# Patient Record
Sex: FEMALE | Race: WHITE | HISPANIC OR LATINO | URBAN - METROPOLITAN AREA
[De-identification: names, ages, dates, MRNs, and addresses within clinical notes are randomized per-mention and may not be internally consistent; named-entity substitution may affect disease eponyms.]

---

## 2023-09-12 ENCOUNTER — OUTPATIENT (OUTPATIENT)
Dept: OUTPATIENT SERVICES | Facility: HOSPITAL | Age: 33
LOS: 1 days | End: 2023-09-12
Payer: COMMERCIAL

## 2023-09-12 ENCOUNTER — APPOINTMENT (OUTPATIENT)
Dept: ULTRASOUND IMAGING | Facility: CLINIC | Age: 33
End: 2023-09-12
Payer: MEDICAID

## 2023-09-12 DIAGNOSIS — Z30.431 ENCOUNTER FOR ROUTINE CHECKING OF INTRAUTERINE CONTRACEPTIVE DEVICE: ICD-10-CM

## 2023-09-12 PROCEDURE — 76830 TRANSVAGINAL US NON-OB: CPT | Mod: 26

## 2023-09-12 PROCEDURE — 76856 US EXAM PELVIC COMPLETE: CPT

## 2023-09-12 PROCEDURE — 76830 TRANSVAGINAL US NON-OB: CPT

## 2023-09-12 PROCEDURE — 76856 US EXAM PELVIC COMPLETE: CPT | Mod: 26

## 2023-12-04 PROBLEM — Z00.00 ENCOUNTER FOR PREVENTIVE HEALTH EXAMINATION: Status: ACTIVE | Noted: 2023-12-04

## 2024-09-09 ENCOUNTER — EMERGENCY (EMERGENCY)
Facility: HOSPITAL | Age: 34
LOS: 0 days | Discharge: HOME | End: 2024-09-09
Attending: EMERGENCY MEDICINE
Payer: MEDICAID

## 2024-09-09 VITALS
SYSTOLIC BLOOD PRESSURE: 113 MMHG | HEART RATE: 73 BPM | TEMPERATURE: 99 F | OXYGEN SATURATION: 98 % | DIASTOLIC BLOOD PRESSURE: 87 MMHG | RESPIRATION RATE: 16 BRPM

## 2024-09-09 VITALS — WEIGHT: 133.38 LBS

## 2024-09-09 DIAGNOSIS — R42 DIZZINESS AND GIDDINESS: ICD-10-CM

## 2024-09-09 LAB — POCT URINE PREGNANCY TEST: NEGATIVE — SIGNIFICANT CHANGE UP

## 2024-09-09 PROCEDURE — 81025 URINE PREGNANCY TEST: CPT

## 2024-09-09 PROCEDURE — 99284 EMERGENCY DEPT VISIT MOD MDM: CPT

## 2024-09-09 PROCEDURE — 99283 EMERGENCY DEPT VISIT LOW MDM: CPT

## 2024-09-09 RX ORDER — ACETAMINOPHEN 325 MG/1
650 TABLET ORAL ONCE
Refills: 0 | Status: COMPLETED | OUTPATIENT
Start: 2024-09-09 | End: 2024-09-09

## 2024-09-09 RX ORDER — MECLIZINE HYDROCHLORIDE 25 MG/1
1 TABLET ORAL
Qty: 30 | Refills: 0
Start: 2024-09-09

## 2024-09-09 RX ORDER — MECLIZINE HYDROCHLORIDE 25 MG/1
25 TABLET ORAL ONCE
Refills: 0 | Status: COMPLETED | OUTPATIENT
Start: 2024-09-09 | End: 2024-09-09

## 2024-09-09 RX ADMIN — ACETAMINOPHEN 650 MILLIGRAM(S): 325 TABLET ORAL at 19:43

## 2024-09-09 RX ADMIN — MECLIZINE HYDROCHLORIDE 25 MILLIGRAM(S): 25 TABLET ORAL at 19:44

## 2024-09-09 NOTE — ED STATDOCS - CARE PROVIDERS DIRECT ADDRESSES
[email or web address]

## 2024-09-09 NOTE — ED STATDOCS - OBJECTIVE STATEMENT
used, id# [id number]  35 y/o female with no pertinent PMHx presents to the ED c/o headache, vertigo, and nausea onset today 3PM. No medications taken for symptoms PTA. Patient states she has been having frequent headaches recently. Denies recent illness, fevers, ear pain/ringing/stuffiness. NKDA. She does not follow with a PCP. LMP 6 months ago ( IUD in place).  Pharmacy: [facility] [address]

## 2024-09-09 NOTE — ED ADULT TRIAGE NOTE - CHIEF COMPLAINT QUOTE
Pt presents to the ED c/o headache. Pt reports onset of headache was at 3 pm, with nausea and dizziness. Pt reports that she feels like the room is spinning, worsening with eye movement. BEFAST -,.

## 2024-09-09 NOTE — ED STATDOCS - NSFOLLOWUPINSTRUCTIONS_ED_ALL_ED_FT
Vértigo  Vertigo  The outer and inner ear showing the eardrum and ear canal.  El vértigo es la sensación de que usted o las cosas que lo rodean se mueven o giran cuando, en realidad, eso no sucede. Es diferente a tener un mareo. También puede causarle lo siguiente:  Pérdida del equilibrio.  Dificultad para mantenerse de pie o caminar.  Náuseas y vómitos.  Esta sensación puede aparecer y desaparecer en cualquier momento. Puede durar de unos pocos segundos a minutos o incluso horas. Puede desaparecer tereza o puede tratarse con medicamentos.    ¿Qué tipos de vértigo se presentan?  Hay dos tipos de vértigo:  El vértigo periférico ocurre cuando partes del oído interno no funcionan emeli deberían. Rae es el tipo más frecuente.  El vértigo central ocurre cuando el cerebro y la médula perez no funcionan emeli deberían.  El médico le hará estudios para averiguar qué tipo de vértigo tiene. Rena Lara lo ayudará a decidir cuál es el tratamiento adecuado para usted.    Siga estas instrucciones en patel casa:  Comida y bebida    Stephanie suficiente líquido para mantener el pis (orina) de color amarillo pálido.  No stephanie alcohol.  Actividad    Cuando se levante por la mañana, siéntese kasie en un lado de la cama. Cuando se sienta olayinka, póngase lentamente de pie mientras se sostiene de algo.  Muévase lentamente. No kevin movimientos bruscos con el cuerpo o con la noris.  Evite algunas posiciones, emeli se lo haya indicado el médico.  Use un bastón si tiene dificultad para ponerse de pie o caminar.  Siéntese de inmediato si se siente inestable.  En patel casa, coloque los objetos de modo que le resulte fácil alcanzarlos sin doblarse o agacharse.  Retome cielo actividades habituales cuando se lo indiquen. Pregunte qué cosas son seguras para que kevin.  Instrucciones generales    Use los medicamentos solamente emeli se lo haya indicado el médico.  Comuníquese con un médico si:  Los medicamentos que le indicaron no lo ayudan o empeoran el vértigo.  Tiene síntomas nuevos.  Tiene fiebre.  Tiene náuseas o vómitos.  Cielo familiares o amigos notan algún cambio en patel manera de actuar.  Se le adormece nadeem parte del cuerpo.  Siente hormigueo y pinchazos en alguna parte del cuerpo.  Tiene lary de noris muy intensos.  Solicite ayuda de inmediato si:  Se siente mareado continuamente o se desmaya.  Tiene rigidez en el baltazar.  Tiene dificultad para moverse o hablar.  Siente debilidad en las andrés, los brazos o las piernas.  Tiene cambios en la audición o la vista.  Estos síntomas pueden indicar nadeem emergencia. Llame al 911 de inmediato.  No espere a jimbo si los síntomas desaparecen.  No conduzca por cielo propios medios hasta el hospital.  Esta información no tiene emeli fin reemplazar el consejo del médico. Asegúrese de hacerle al médico cualquier pregunta que tenga.

## 2024-09-09 NOTE — ED STATDOCS - NSFOLLOWUPCLINICS_GEN_ALL_ED_FT
[facility]  [address] [place] [address]  Phone: [phone]  Fax:   Follow Up Time: Urgent

## 2024-09-09 NOTE — ED STATDOCS - PATIENT PORTAL LINK FT
You can access the FollowMyHealth Patient Portal offered by [facility] by registering at the following website: [email or web address]. By joining [employer]s FollowMyHealth portal, you will also be able to view your health information using other applications (apps) compatible with our system.

## 2024-09-09 NOTE — ED STATDOCS - CLINICAL SUMMARY MEDICAL DECISION MAKING FREE TEXT BOX
Patient appears well, nontoxic, nonfocal neurologic exam, including no dysmetria, no dysarthria, no ataxia.  Likely benign vertigo.  UCG was negative.  Patient given meclizine, Tylenol.  Have low concern for intracranial emergency, does not require any imaging at this time.  Okay for discharge home with supportive care, recommend close outpatient follow-up for her vertigo.  Strict return precautions given for any worsening.  Patient verbalized understanding agrees plan.

## 2024-09-09 NOTE — ED STATDOCS - PROGRESS NOTE DETAILS
PA note: All labwork/radiology results discussed in detail with patient. Patient re-examined and re-evaluated. Patient feels much better at this time. ED evaluation, Diagnosis and management discussed with the patient in detail. Workup results discussed with ED attending, OK to dc home. Close ENT/NEURO PMD follow up encouraged, aftercare to assist with scheduling appointment ASAP. Strict ED return instructions discussed in detail and patient given the opportunity to ask any questions about their discharge diagnosis and instructions. Patient verbalized understanding. ~ Heriberto Silverio PA-C PA: Patient is a 33 y/o female with no significant PMHx who presents to [facility] c/o headache, vertigo, and nausea onset today 3PM. Patient c/o "room spinning" feeling that is intermittent. DENIES recent illness, fevers, ear pain/ringing/stuffiness. ~Heriberto Silverio PA-C

## 2024-09-09 NOTE — ED STATDOCS - PROVIDER TOKENS
PROVIDER:[TOKEN:[[id number]],FOLLOWUP:[Urgent]],PROVIDER:[TOKEN:[[id number]],FOLLOWUP:[Urgent]]

## 2024-09-09 NOTE — ED STATDOCS - PHYSICAL EXAMINATION
Patient awake, alert, orient x 3, no acute distress  Heart sounds within normal limits, regular rate rhythm, no murmur  Lungs are clear bilaterally  Abdomen soft, nontender, nondistended.  Extremities are well-perfused  Fatigable horizontal left beating nystagmus. No dysarthria, dysmetria, no ataxia.   Skin without rash attending: Patient awake, alert, orient x 3, no acute distress  Heart sounds within normal limits, regular rate rhythm, no murmur  Lungs are clear bilaterally  Abdomen soft, nontender, nondistended.  Extremities are well-perfused  Fatigable horizontal left beating nystagmus. No dysarthria, dysmetria, no ataxia.   Skin without rash

## 2024-09-09 NOTE — ED STATDOCS - CARE PROVIDER_API CALL
Obdulio White  Neurology  [address], [address]  [place] [address]  Phone: [phone]  Fax: [phone]  Follow Up Time: Urgent    Angelo Chavez.  Otolaryngology  [address], [address]  [place] [address]  Phone: [phone]  Fax: [phone]  Follow Up Time: Urgent